# Patient Record
Sex: FEMALE | Race: WHITE | NOT HISPANIC OR LATINO | Employment: UNEMPLOYED | URBAN - METROPOLITAN AREA
[De-identification: names, ages, dates, MRNs, and addresses within clinical notes are randomized per-mention and may not be internally consistent; named-entity substitution may affect disease eponyms.]

---

## 2022-08-23 ENCOUNTER — TELEPHONE (OUTPATIENT)
Dept: NEUROSURGERY | Facility: CLINIC | Age: 62
End: 2022-08-23

## 2022-08-23 NOTE — TELEPHONE ENCOUNTER
PT CALLED TO SCHEDULE A NEW PT APT  SENT MESSAGE TO AYDEE ROLLE  TO CALL PT AND COMPLETE C-SPINE INTAKE  PT WAS JUST ADDED TO EPIC  REFERRED BY HER NEUROLOGIST DR GARNICA FROM [facility]  PT CAN ONLY SEE DR LOUISE SINCE INSURANCE IS [facility] AND ONLY DR LOUISE PARS  PT HAD MRI AND CT OF C-SPINE IN [facility] 2021 AND JAN 2022 AT [address]   PT HAS THE DISC AND WILL FAX OVER REPORT

## 2022-08-30 NOTE — TELEPHONE ENCOUNTER
I CALLED PATIENT -[phone] AND LM X1 FOR CALL BACK TO ENTER REFERRAL AND COMPLETE SPINE INTAKE      STUDIES: MRI [facility] [place] AND MRI [facility] 2/20/22 ISABELA

## 2022-09-30 ENCOUNTER — OFFICE VISIT (OUTPATIENT)
Dept: NEUROSURGERY | Facility: CLINIC | Age: 62
End: 2022-09-30
Payer: COMMERCIAL

## 2022-09-30 VITALS
HEIGHT: 66 IN | SYSTOLIC BLOOD PRESSURE: 130 MMHG | HEART RATE: 80 BPM | TEMPERATURE: 98 F | RESPIRATION RATE: 18 BRPM | DIASTOLIC BLOOD PRESSURE: 82 MMHG | BODY MASS INDEX: 35.36 KG/M2 | WEIGHT: 220 LBS

## 2022-09-30 DIAGNOSIS — M54.2 NECK PAIN: ICD-10-CM

## 2022-09-30 DIAGNOSIS — M54.50 LOWER BACK PAIN: ICD-10-CM

## 2022-09-30 DIAGNOSIS — R26.81 GAIT INSTABILITY: Primary | ICD-10-CM

## 2022-09-30 DIAGNOSIS — R42 DIZZINESS: ICD-10-CM

## 2022-09-30 PROCEDURE — 99203 OFFICE O/P NEW LOW 30 MIN: CPT | Performed by: PHYSICIAN ASSISTANT

## 2022-09-30 RX ORDER — CETIRIZINE HYDROCHLORIDE 10 MG/1
10 TABLET, CHEWABLE ORAL DAILY
Qty: 30 TABLET | Refills: 0 | Status: SHIPPED | OUTPATIENT
Start: 2022-09-30

## 2022-09-30 RX ORDER — AMLODIPINE BESYLATE 5 MG/1
TABLET ORAL
COMMUNITY
Start: 2022-09-16

## 2022-09-30 RX ORDER — PAROXETINE HYDROCHLORIDE 20 MG/1
TABLET, FILM COATED ORAL
COMMUNITY
Start: 2022-09-03

## 2022-09-30 RX ORDER — INSULIN DEGLUDEC 200 U/ML
INJECTION, SOLUTION SUBCUTANEOUS
COMMUNITY
Start: 2022-08-26

## 2022-09-30 RX ORDER — ASPIRIN 81 MG/1
TABLET ORAL EVERY 24 HOURS
COMMUNITY

## 2022-09-30 RX ORDER — GABAPENTIN 300 MG/1
CAPSULE ORAL
COMMUNITY

## 2022-09-30 RX ORDER — ONDANSETRON 4 MG/1
TABLET, FILM COATED ORAL
COMMUNITY

## 2022-09-30 RX ORDER — LISINOPRIL 5 MG/1
1 TABLET ORAL DAILY
COMMUNITY

## 2022-09-30 RX ORDER — FLUTICASONE PROPIONATE 50 MCG
SPRAY, SUSPENSION (ML) NASAL
COMMUNITY

## 2022-09-30 RX ORDER — CETIRIZINE HYDROCHLORIDE 10 MG/1
10 TABLET ORAL DAILY
COMMUNITY

## 2022-09-30 RX ORDER — BLOOD SUGAR DIAGNOSTIC
STRIP MISCELLANEOUS
COMMUNITY

## 2022-09-30 RX ORDER — ALBUTEROL SULFATE 2.5 MG/3ML
SOLUTION RESPIRATORY (INHALATION)
COMMUNITY

## 2022-09-30 NOTE — PROGRESS NOTES
Neurosurgery Office Note  Queenie Ridley 65 y o  female MRN: [id number]      Assessment/Plan      Patient is a 65 yrs old pleasant woman with PMHx of HTN, self referred for evaluation of posterior cervical spine pain radiating down to her UEs  Patient reports some associated paresthesia,  weakness, slight fine motor dysfunction, dropping objects an,d gait instability  She has Hx of vertigo taking cetirizine  Patient denies B/B dysfunction  No HELENA/PT  She denies any pervious Cervical spine surgery but reports Hx of fall in 2021  Had MRI of Cx spine on 11/23/2021 demonstrate idiopathic skeletal hyperostosis and 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  Multilevel spondylitic changes of the cervical spine including severe spinal canal stenosis at C1 and moderate spinal canal stenosis at C4-5 with associated cord flattening and suggestion of mild cord edema  Exam-A&OX3, PERRL, EOMI 2 mm conj bilateral  Karel  Finger to nose test bilateraly   weakness noted 4/5 bilaterally  Elbow flex/Ext 5/5, some limitation in shoulder abduction on the left slight  LT discrepancy arms bilaterally  DTR brisk bilat  Neg Hercules's test bilaterally  Tandem gait instability  Tenderness on posterior cervical spine palpation  Hx, PEx, and images reviewed with the patient  Mx plan discussed  I would recommend uptodate MRI of cervical spine wo contrast  Ambulatory referral to Pain Mx & PT order placed  Citirizine for her dizziness and vertigo script sent to her pharmacy  Advised fall precaution, avoid lifting heavy objects, strenuous activities with excessive flexion/extending of cervical spine, avoid vigorous cervical spine manipulation, axial loading  F/U after images results, SNPx Dr Margi Maria  All questions and concerns were answered  Patient expressed his understandings and agreed with the plan  Plan:  1  MRI of Cx spine wo contrast  2   Ambulatory referral to pain Mx  3  Ambulatory referral to PT  4  Script for citrizine  5  F/U after images results, SNPx  Dr Alicia Hyde    I spent  30 minutes with the patient today in which >50% of the time was spent counseling/coordination of care regarding diagnosis, imaging review, symptoms and treatment plan  C/C: " neck pain with Bilateral UEs radic"/Progressive     History of Present Illness      Patient is a 65 yrs old pleasant woman with PMHx of HTN, self referred for evaluation of posterior cervical spine pain radiating down to her UEs  Patient reports some associated paresthesia,  weakness, slight fine motor dysfunction, dropping objects and gait instability  Also c/o stifiness and decreased ROm  She has Hx of vertigo taking cetirizine  Patient denies B/B dysfunction  No HELENA/PT  She denies any pervious Cervical spine surgery but reports Hx of fall in 2021  Had MRI of Cx spine on 11/23/2021 demonstrate idiopathic skeletal hyperostosis and 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  Multilevel spondylitic changes of the cervical spine including severe spinal canal stenosis at C1 and moderate spinal canal stenosis at C4-5 with associated cord flattening and suggestion of mild cord edema  Patient tried  Remedies, denies PT, but tried HELENA at [facility] with temporary relief  Patient denies Hx of fever,chills, rigors, cough or chest pain  Denies Hx of DM, CHF, Stroke, seizures, bleeding disorder or taking anticoagulant meds  Images findings as described in the assessment section above  REVIEW OF SYSTEMS  Review of system personally reviewed and updated  Review of Systems   Constitutional: Negative  HENT: Negative  Eyes: Negative  Respiratory: Negative  Cardiovascular: Negative  Gastrointestinal: Negative  Endocrine: Negative  H/o Kidney Disease     Genitourinary: Negative      Musculoskeletal: Positive for gait problem (Difficulty walking, uses walker for assistance ), neck pain (Neck pain  radiates to b/l arms- S/p fall in 7/2021 ,) and neck stiffness (Decrease ROM)  Negative for back pain  Meds: Gabapentin-- PT: NONE/  Injection @ [facility] in 2/2022    C/S & L/S MRI done 2/2022 @ [facility] -- PWB DISC   Skin: Negative  Allergic/Immunologic: Negative  Neurological: Positive for weakness ( Difficulty with grasp), numbness (and tingling on b/l arms) and headaches  Hematological: Negative  Psychiatric/Behavioral: Positive for sleep disturbance (due to pain)  All other systems reviewed and are negative  Meds/Allergies     No current outpatient medications on file  No current facility-administered medications for this visit  Not on File    PAST HISTORY    No past medical history on file  No past surgical history on file  No family history on file  Above history personally reviewed  EXAM    Vitals: There were no vitals taken for this visit  ,There is no height or weight on file to calculate BMI  Physical Exam  Constitutional:       Appearance: Normal appearance  HENT:      Head: Normocephalic and atraumatic  Cardiovascular:      Rate and Rhythm: Normal rate and regular rhythm  Pulses: Normal pulses  Heart sounds: Normal heart sounds  Pulmonary:      Effort: Pulmonary effort is normal    Musculoskeletal:      Cervical back: Tenderness present  Neurological:      General: No focal deficit present  Mental Status: She is alert and oriented to person, place, and time  GCS: GCS eye subscore is 4  GCS verbal subscore is 5  GCS motor subscore is 6  Cranial Nerves: Cranial nerves are intact  Sensory: Sensory deficit present  Motor: Weakness present  Coordination: Finger-Nose-Finger Test normal       Deep Tendon Reflexes: Reflexes are normal and symmetric  Reflex Scores:       Tricep reflexes are 2+ on the right side and 2+ on the left side         Bicep reflexes are 2+ on the right side and 2+ on the left side  Brachioradialis reflexes are 2+ on the right side and 2+ on the left side  Patellar reflexes are 2+ on the right side and 2+ on the left side  Achilles reflexes are 2+ on the right side and 2+ on the left side  Psychiatric:         Speech: Speech normal          Neurologic Exam     Mental Status   Oriented to person, place, and time  Speech: speech is normal   Level of consciousness: alert    Cranial Nerves     CN III, IV, VI   Nystagmus: none     CN XI   CN XI normal      Motor Exam   Muscle bulk: normal  Overall muscle tone: normal  Right arm tone: normal  Left arm tone: normal  Right arm pronator drift: absent  Left arm pronator drift: absent  Right leg tone: normal  Left leg tone: normal    Sensory Exam   Right arm light touch: normal  Left arm light touch: decreased from elbow  Right leg light touch: normal  Left leg light touch: normal    Gait, Coordination, and Reflexes     Coordination   Finger to nose coordination: normal    Reflexes   Right brachioradialis: 2+  Left brachioradialis: 2+  Right biceps: 2+  Left biceps: 2+  Right triceps: 2+  Left triceps: 2+  Right patellar: 2+  Left patellar: 2+  Right achilles: 2+  Left achilles: 2+  Right : 2+  Left : 2+  Right Hercules: absent  Left Hercules: absent  Right ankle clonus: absent  Left pendular knee jerk: absent        MEDICAL DECISION MAKING    Imaging Studies:     No results found      I have personally reviewed pertinent reports   , I have personally reviewed pertinent films in PACS and I have personally reviewed pertinent films in PACS with a Radiologist

## 2022-10-20 ENCOUNTER — TELEPHONE (OUTPATIENT)
Dept: NEUROSURGERY | Facility: CLINIC | Age: 62
End: 2022-10-20

## 2022-10-20 NOTE — TELEPHONE ENCOUNTER
I Called spoke with pt she is in [place] she wanted me to cx her upcoming  mri for fela at [facility] and also f/u apt she will call her insurance company and see where she can go and reschedule f/u after she books mri somewhere in network -per [employer] [facility] [place] out of network higher oop cost to pt [employer]